# Patient Record
Sex: FEMALE | Race: WHITE | NOT HISPANIC OR LATINO
[De-identification: names, ages, dates, MRNs, and addresses within clinical notes are randomized per-mention and may not be internally consistent; named-entity substitution may affect disease eponyms.]

---

## 2019-04-30 PROBLEM — Z00.00 ENCOUNTER FOR PREVENTIVE HEALTH EXAMINATION: Status: ACTIVE | Noted: 2019-04-30

## 2019-05-07 ENCOUNTER — APPOINTMENT (OUTPATIENT)
Dept: ORTHOPEDIC SURGERY | Facility: CLINIC | Age: 39
End: 2019-05-07

## 2021-04-05 ENCOUNTER — APPOINTMENT (OUTPATIENT)
Dept: PLASTIC SURGERY | Facility: CLINIC | Age: 41
End: 2021-04-05
Payer: SELF-PAY

## 2021-04-05 DIAGNOSIS — L70.0 ACNE VULGARIS: ICD-10-CM

## 2021-04-05 PROCEDURE — 64612 DESTROY NERVE FACE MUSCLE: CPT | Mod: 50

## 2021-04-05 RX ORDER — TRETINOIN 0.25 MG/G
0.03 CREAM TOPICAL
Qty: 2 | Refills: 2 | Status: ACTIVE | COMMUNITY
Start: 2021-04-05 | End: 1900-01-01

## 2021-04-05 NOTE — HISTORY OF PRESENT ILLNESS
[FreeTextEntry1] : Botox\par Cosmetic Procedure Note\par \par HPI: Nida is a 40 year old female who presents for Botox injection for cosmetic concerns. Her main cosmetic complaints are regarding lines of the forehead and bilateral crow's feet.  Risks and benefits discussed extensively with patient.  Some risks discussed include bruising, slight swelling at the sight of injection, under correction (not enough effect) or over correction (too much effect), generalized weakness, facial Asymmetry (one side looks different than the other), permanent loss of muscle tone with repeated injection, flu-like syndrome, paralysis of a nearby muscle (in less than 2% of patients) leading to: a temporary eyelid ptosis (droop), double vision, inability to close eye, difficulty whistling or drinking from a straw.  She understands that Botox is not permanent, and will soften facial lines but not make them go away. Patient declares that she is currently not trying to conceive, is not currently pregnant or breast feeding. \par  The patient also reports acne of her lower face, which has been resistant to over the counter treatments including salicylic acid and benzoyl peroxide treatments.

## 2021-04-22 ENCOUNTER — APPOINTMENT (OUTPATIENT)
Dept: PLASTIC SURGERY | Facility: CLINIC | Age: 41
End: 2021-04-22
Payer: SELF-PAY

## 2021-04-22 VITALS
BODY MASS INDEX: 23.92 KG/M2 | TEMPERATURE: 98.6 F | OXYGEN SATURATION: 100 % | HEART RATE: 64 BPM | DIASTOLIC BLOOD PRESSURE: 71 MMHG | WEIGHT: 130 LBS | HEIGHT: 62 IN | SYSTOLIC BLOOD PRESSURE: 118 MMHG

## 2021-04-22 DIAGNOSIS — L98.8 OTHER SPECIFIED DISORDERS OF THE SKIN AND SUBCUTANEOUS TISSUE: ICD-10-CM

## 2021-04-22 PROCEDURE — 64612 DESTROY NERVE FACE MUSCLE: CPT

## 2021-04-22 NOTE — HISTORY OF PRESENT ILLNESS
[FreeTextEntry1] : Botox\par Cosmetic Procedure Note\par \par HPI: Nida is a 40 year old female who presents for a follow up for Botox injection for cosmetic concerns. She was treated a little over two weeks ago.  She is pleased with the forehead and crows feet, but feels her 11's (glabella) are still prominent.   Risks and benefits discussed extensively with patient.  Some risks discussed include bruising, slight swelling at the sight of injection, under correction (not enough effect) or over correction (too much effect), generalized weakness, facial Asymmetry (one side looks different than the other), permanent loss of muscle tone with repeated injection, flu-like syndrome, paralysis of a nearby muscle (in less than 2% of patients) leading to: a temporary eyelid ptosis (droop), double vision, inability to close eye, difficulty whistling or drinking from a straw.  She understands that Botox is not permanent, and will soften facial lines but not make them go away. Patient declares that she is currently not trying to conceive, is not currently pregnant or breast feeding. \par \par

## 2021-09-09 ENCOUNTER — APPOINTMENT (OUTPATIENT)
Dept: PLASTIC SURGERY | Facility: CLINIC | Age: 41
End: 2021-09-09
Payer: SELF-PAY

## 2021-09-09 PROCEDURE — 64612 DESTROY NERVE FACE MUSCLE: CPT

## 2021-09-09 NOTE — HISTORY OF PRESENT ILLNESS
[FreeTextEntry1] : Botox\par Cosmetic Procedure Note\par \par HPI: Nida is a 41 year old female who presents for Botox injection for cosmetic concerns. Her main cosmetic complaints are regarding lines of the forehead and bilateral crow's feet.  Risks and benefits discussed extensively with patient.  Some risks discussed include bruising, slight swelling at the sight of injection, under correction (not enough effect) or over correction (too much effect), generalized weakness, facial Asymmetry (one side looks different than the other), permanent loss of muscle tone with repeated injection, flu-like syndrome, paralysis of a nearby muscle (in less than 2% of patients) leading to: a temporary eyelid ptosis (droop), double vision, inability to close eye, difficulty whistling or drinking from a straw.  She understands that Botox is not permanent, and will soften facial lines but not make them go away. Patient declares that she is currently not trying to conceive, is not currently pregnant or breast feeding. \par \par

## 2022-03-30 ENCOUNTER — APPOINTMENT (OUTPATIENT)
Dept: PLASTIC SURGERY | Facility: CLINIC | Age: 42
End: 2022-03-30
Payer: SELF-PAY

## 2022-03-30 VITALS — WEIGHT: 130 LBS | BODY MASS INDEX: 23.92 KG/M2 | HEIGHT: 62 IN

## 2022-03-30 PROCEDURE — 64615 CHEMODENERV MUSC MIGRAINE: CPT

## 2022-11-07 ENCOUNTER — NON-APPOINTMENT (OUTPATIENT)
Age: 42
End: 2022-11-07

## 2022-11-07 ENCOUNTER — APPOINTMENT (OUTPATIENT)
Dept: ORTHOPEDIC SURGERY | Facility: CLINIC | Age: 42
End: 2022-11-07

## 2022-11-07 ENCOUNTER — APPOINTMENT (OUTPATIENT)
Dept: OBGYN | Facility: CLINIC | Age: 42
End: 2022-11-07

## 2022-11-07 VITALS — WEIGHT: 127 LBS | BODY MASS INDEX: 22.5 KG/M2 | HEIGHT: 63 IN

## 2022-11-07 DIAGNOSIS — S76.312A STRAIN OF MUSCLE, FASCIA AND TENDON OF THE POSTERIOR MUSCLE GROUP AT THIGH LEVEL, LEFT THIGH, INITIAL ENCOUNTER: ICD-10-CM

## 2022-11-07 PROCEDURE — 99203 OFFICE O/P NEW LOW 30 MIN: CPT

## 2022-11-07 RX ORDER — TRIAMCINOLONE ACETONIDE 1 MG/G
0.1 OINTMENT TOPICAL
Qty: 60 | Refills: 0 | Status: ACTIVE | COMMUNITY
Start: 2022-07-27

## 2022-11-07 RX ORDER — FLUOROURACIL 50 MG/G
5 CREAM TOPICAL
Qty: 40 | Refills: 0 | Status: ACTIVE | COMMUNITY
Start: 2022-07-27

## 2022-11-07 NOTE — DISCUSSION/SUMMARY
[de-identified] : Discussed findings of today's exam and possible causes of patient's pain.  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  Reviewed possible courses of treatment, and we collaboratively decided best course of treatment at this time will include conservative management.  Patient has no evidence of any significant myofascial tear or rupture, no surgical indications based on history and clinical exam, recommend deferral of MRI.  Patient has been doing some chiropractic care and some cupping therapy.  She is advised that with this deep muscle injury I recommend a more active form of recovery.  Patient will be started on a course of physical therapy to restore normal range of motion and strength as tolerated.  We discussed activity modification with focus on low impact exercise such as elliptical and rowing.  Patient may continue take oral NSAIDs as needed for pain relief.  Follow up as needed.  Patient appreciates and agrees with current plan.\par \par I work as part of an academic orthopedic group and routinely have a physician in training (resident / fellow) working with me.  Any part of the history and physical exam performed by the physician in training was either directly reviewed and/or replicated by myself.  Any procedure performed by the physician in training was performed under my direct supervision and with the consent of the patient.\par \par This note was generated using dragon medical dictation software.  A reasonable effort has been made for proofreading its contents, but typos may still remain.  If there are any questions or points of clarification needed please notify my office.\par

## 2022-11-07 NOTE — PHYSICAL EXAM
[de-identified] : Constitutional: Well-nourished, well-developed, No acute distress\par Respiratory:  Good respiratory effort, no SOB\par Lymphatic: No regional lymphadenopathy, no lymphedema\par Psychiatric: Pleasant and normal affect, alert and oriented x3\par Musculoskeletal: normal except where as noted in regional exam\par \par Left hip:\par \par APPEARANCE: no marked deformities, no swelling or malalignment\par POSITIVE TENDERNESS: Mild at proximal hamstring and ischial tuberosity.\par NONTENDER: greater trochanter, TFL, gluteal region, hip flexor region, sartorius and pubic symphysis. \par ROM: Limited lumbar flexion, limited hip flexion, limited hip extension, all due to pain in the proximal hamstring region. \par RESISTIVE TESTING: + pain in proximal hamstring region with resisted hip extension, moreso with the knee in flexion than with the knee in extension. painless resisted ER/IR/SLR/abduction/adduction. \par SPECIAL TESTS: neg GUILHERME/FADIR\par

## 2022-11-07 NOTE — HISTORY OF PRESENT ILLNESS
[de-identified] : Patient is here for left leg pain. 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 Denies trauma or falls. She still feel pain mostly with running and walking and it is lingering. Pain starts at he initiation of a run and will last for the duration, but less milage will decrease the pain. She has been to a chiropractor who has done cupping, active release which has helped minimally. No radiation of pain below the knee or on the opposite side. \par \par The patient's past medical history, past surgical history, medications and allergies were reviewed by me today and documented accordingly. In addition, the patient's family and social history, which were noncontributory to this visit, were reviewed also. Intake form was reviewed. The patient has no family history of arthritis.

## 2022-11-22 ENCOUNTER — APPOINTMENT (OUTPATIENT)
Dept: PLASTIC SURGERY | Facility: CLINIC | Age: 42
End: 2022-11-22

## 2022-11-22 VITALS
TEMPERATURE: 98.1 F | DIASTOLIC BLOOD PRESSURE: 73 MMHG | OXYGEN SATURATION: 98 % | WEIGHT: 127 LBS | HEART RATE: 68 BPM | BODY MASS INDEX: 22.5 KG/M2 | HEIGHT: 63 IN | SYSTOLIC BLOOD PRESSURE: 122 MMHG

## 2022-11-22 DIAGNOSIS — Z41.1 ENCOUNTER FOR COSMETIC SURGERY: ICD-10-CM

## 2022-11-22 PROCEDURE — 64615 CHEMODENERV MUSC MIGRAINE: CPT

## 2022-11-22 NOTE — HISTORY OF PRESENT ILLNESS
[FreeTextEntry1] : Botox\par Cosmetic Procedure Note\par \par HPI: Nida is a 42 year old female who presents for Botox injection for cosmetic concerns. Her main cosmetic complaints are regarding lines of the forehead and bilateral crow's feet.  Risks and benefits discussed extensively with patient.  Some risks discussed include bruising, slight swelling at the sight of injection, under correction (not enough effect) or over correction (too much effect), generalized weakness, facial Asymmetry (one side looks different than the other), permanent loss of muscle tone with repeated injection, flu-like syndrome, paralysis of a nearby muscle (in less than 2% of patients) leading to: a temporary eyelid ptosis (droop), double vision, inability to close eye, difficulty whistling or drinking from a straw.  She understands that Botox is not permanent, and will soften facial lines but not make them go away. Patient declares that she is currently not trying to conceive, is not currently pregnant or breast feeding. \par

## 2023-01-03 ENCOUNTER — TRANSCRIPTION ENCOUNTER (OUTPATIENT)
Age: 43
End: 2023-01-03